# Patient Record
Sex: MALE | Race: WHITE | NOT HISPANIC OR LATINO | Employment: FULL TIME | URBAN - METROPOLITAN AREA
[De-identification: names, ages, dates, MRNs, and addresses within clinical notes are randomized per-mention and may not be internally consistent; named-entity substitution may affect disease eponyms.]

---

## 2022-10-01 ENCOUNTER — HOSPITAL ENCOUNTER (EMERGENCY)
Facility: HOSPITAL | Age: 37
Discharge: HOME | End: 2022-10-01
Attending: FAMILY MEDICINE
Payer: COMMERCIAL

## 2022-10-01 VITALS
TEMPERATURE: 98 F | DIASTOLIC BLOOD PRESSURE: 104 MMHG | OXYGEN SATURATION: 100 % | BODY MASS INDEX: 25.41 KG/M2 | SYSTOLIC BLOOD PRESSURE: 144 MMHG | HEIGHT: 74 IN | RESPIRATION RATE: 18 BRPM | WEIGHT: 198 LBS | HEART RATE: 91 BPM

## 2022-10-01 DIAGNOSIS — S05.02XA ABRASION OF LEFT CORNEA, INITIAL ENCOUNTER: Primary | ICD-10-CM

## 2022-10-01 DIAGNOSIS — H18.892 CORNEAL RUST RING OF LEFT EYE: ICD-10-CM

## 2022-10-01 PROCEDURE — 25000003 PHARM REV CODE 250: Performed by: NURSE PRACTITIONER

## 2022-10-01 PROCEDURE — 99284 EMERGENCY DEPT VISIT MOD MDM: CPT

## 2022-10-01 RX ORDER — OFLOXACIN 3 MG/ML
2 SOLUTION/ DROPS OPHTHALMIC 4 TIMES DAILY
Qty: 5 ML | Refills: 0 | Status: SHIPPED | OUTPATIENT
Start: 2022-10-01 | End: 2022-10-06

## 2022-10-01 RX ORDER — TETRACAINE HYDROCHLORIDE 5 MG/ML
2 SOLUTION OPHTHALMIC ONCE
Status: COMPLETED | OUTPATIENT
Start: 2022-10-01 | End: 2022-10-01

## 2022-10-01 RX ORDER — HYDROCODONE BITARTRATE AND ACETAMINOPHEN 5; 325 MG/1; MG/1
1 TABLET ORAL EVERY 6 HOURS PRN
Qty: 10 TABLET | Refills: 0 | Status: SHIPPED | OUTPATIENT
Start: 2022-10-01

## 2022-10-01 RX ADMIN — TETRACAINE HYDROCHLORIDE 2 DROP: 5 SOLUTION OPHTHALMIC at 02:10

## 2022-10-01 RX ADMIN — FLUORESCEIN SODIUM 1 EACH: 1 STRIP OPHTHALMIC at 02:10

## 2022-10-01 NOTE — DISCHARGE INSTRUCTIONS
Take medications as prescribed. You can also use plain artificial tears over the counter. Follow-up with ophthalmology for close follow-up. Return to the ED for any worsening eye pain, eye swelling, or any worsening symptoms or concerns at all.

## 2022-10-01 NOTE — ED PROVIDER NOTES
Encounter Date: 10/1/2022       History     Chief Complaint   Patient presents with    Eye Problem     Redness left eye may have gotten something in it     36-year-old male presents with left eye pain and reports that he feels like he has something in his eye. 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 He denies any visual changes. He reports that his eye is very tender and has been draining white discharge. He also reports mild swelling to the upper eyelid. He denies any fever. He denies that he wears contacts.     Review of patient's allergies indicates:   Allergen Reactions    Pcn [penicillins] Rash     History reviewed. No pertinent past medical history.  History reviewed. No pertinent surgical history.  History reviewed. No pertinent family history.  Social History     Tobacco Use    Smoking status: Former     Types: Cigarettes    Smokeless tobacco: Current   Substance Use Topics    Alcohol use: Yes    Drug use: Never     Review of Systems   Constitutional:  Negative for fever.   Eyes:  Positive for pain, discharge and redness. Negative for photophobia, itching and visual disturbance.        Foreign body sensation     Physical Exam     Initial Vitals [10/01/22 1335]   BP Pulse Resp Temp SpO2   (!) 144/104 91 18 97.9 °F (36.6 °C) 100 %      MAP       --         Physical Exam    Nursing note and vitals reviewed.  Constitutional: He appears well-developed and well-nourished. He is not diaphoretic.   HENT:   Head: Normocephalic and atraumatic.   Right Ear: External ear normal.   Left Ear: External ear normal.   Eyes: EOM and lids are normal. Pupils are equal, round, and reactive to light. Lids are everted and swept, no foreign bodies found. Right eye exhibits no discharge. Left eye exhibits discharge. Right conjunctiva is not injected. Right conjunctiva has no hemorrhage. Left conjunctiva is injected. Left conjunctiva has no hemorrhage. No scleral icterus. Right eye exhibits  normal extraocular motion and no nystagmus. Left eye exhibits normal extraocular motion and no nystagmus.   Slit lamp exam:       The left eye shows fluorescein uptake.       Red conjunctivae with white copious drainage. Left corneal rust ring noted without foreign body. Corneal abrasion noted.   Neck:   Normal range of motion.  Cardiovascular:  Normal rate.           Pulmonary/Chest: No respiratory distress.   Musculoskeletal:         General: Normal range of motion.      Cervical back: Normal range of motion.     Neurological: He is alert and oriented to person, place, and time. GCS score is 15. GCS eye subscore is 4. GCS verbal subscore is 5. GCS motor subscore is 6.   Skin: Skin is warm. Capillary refill takes less than 2 seconds.   Psychiatric: He has a normal mood and affect.       ED Course   Procedures  Labs Reviewed - No data to display       Imaging Results    None          Medications   TETRAcaine HCl (PF) 0.5 % Drop 2 drop (2 drops Right Eye Given 10/1/22 1415)   fluorescein ophthalmic strip 1 each (1 each Right Eye Given 10/1/22 1415)     Medical Decision Making:   Differential Diagnosis:   Foreign body, Corneal rust ring, Corneal abrasion, Conjunctivitis  ED Management:  36-year-old male presents with left eye pain, white eye discharge, and foreign body sensation to left eye. Patient denies any visual changes. Corneal rust ring noted to left eye without foreign body noted. Eyelids inverted and no foreign body visualized. Fluorescein uptake noted to the right of the rust ring. Will have him start Ofloxaxcin due to corneal abrasion and use artificial tears. Norco as needed for pain. Follow-up with ophthalmology. Return to the ED for any worsening eye pain, eye swelling, visual changes, or any worsening symptoms or concerns at all.                         Clinical Impression:   Final diagnoses:  [S05.02XA] Abrasion of left cornea, initial encounter (Primary)  [H18.892] Corneal rust ring of left eye       ED Disposition Condition    Discharge Stable          ED Prescriptions       Medication Sig Dispense Start Date End Date Auth. Provider    ofloxacin (OCUFLOX) 0.3 % ophthalmic solution Place 2 drops into the left eye 4 (four) times daily. for 5 days 5 mL 10/1/2022 10/6/2022 Jory Lynn NP    HYDROcodone-acetaminophen (NORCO) 5-325 mg per tablet Take 1 tablet by mouth every 6 (six) hours as needed for Pain. 10 tablet 10/1/2022 -- Jory Lynn NP          Follow-up Information    None          Jory Lynn NP  10/01/22 8951